# Patient Record
Sex: FEMALE | Race: WHITE | NOT HISPANIC OR LATINO | Employment: FULL TIME | URBAN - METROPOLITAN AREA
[De-identification: names, ages, dates, MRNs, and addresses within clinical notes are randomized per-mention and may not be internally consistent; named-entity substitution may affect disease eponyms.]

---

## 2021-05-26 ENCOUNTER — INITIAL PRENATAL (OUTPATIENT)
Dept: OBSTETRICS AND GYNECOLOGY | Facility: CLINIC | Age: 31
End: 2021-05-26

## 2021-05-26 VITALS — WEIGHT: 120.8 LBS | DIASTOLIC BLOOD PRESSURE: 60 MMHG | SYSTOLIC BLOOD PRESSURE: 104 MMHG | BODY MASS INDEX: 22.09 KG/M2

## 2021-05-26 DIAGNOSIS — Z3A.08 8 WEEKS GESTATION OF PREGNANCY: Primary | ICD-10-CM

## 2021-05-26 DIAGNOSIS — R87.619 ABNORMAL CERVICAL PAPANICOLAOU SMEAR, UNSPECIFIED ABNORMAL PAP FINDING: ICD-10-CM

## 2021-05-26 PROBLEM — Z34.90 PREGNANCY: Status: ACTIVE | Noted: 2021-05-26

## 2021-05-26 PROCEDURE — 0501F PRENATAL FLOW SHEET: CPT | Performed by: OBSTETRICS & GYNECOLOGY

## 2021-05-26 RX ORDER — PRENATAL VIT,CAL 76/IRON/FOLIC 29 MG-1 MG
1 TABLET ORAL DAILY
Qty: 30 TABLET | Refills: 11 | Status: SHIPPED | OUTPATIENT
Start: 2021-05-26 | End: 2021-06-25

## 2021-05-26 NOTE — PROGRESS NOTES
Initial ob visit     CC- Here for care of pregnancy        Karuna Mark is a 30 y.o. female, , who presents for her first obstetrical visit.  Her last LMP was Patient's last menstrual period was 2021 (approximate)..    OB History    Para Term  AB Living   2 1 1     1   SAB TAB Ectopic Molar Multiple Live Births             1      # Outcome Date GA Lbr Kaiser/2nd Weight Sex Delivery Anes PTL Lv   2 Current            1 Term         WALE       Initial positive test date : 2021, UPT          Prior obstetric issues, potential pregnancy concerns: None  Family history of genetic issues (includes FOB): None  Prior infections concerning in pregnancy (Rash, fever in last 2 weeks): No   Varicella Hx -No   Prior testing for Cystic Fibrosis Carrier or Sickle Cell Trait- No  Prepregnancy BMI - Body mass index is 22.09 kg/m².  History of STD: yes GC/CHLAMYDIA  Ultrasound Today: Yes.    Additional Pertinent History   Last Pap :   Last Completed Pap Smear     This patient has no relevant Health Maintenance data.        History of abnormal Pap smear: yes -   Family history of uterine, colon, breast, or ovarian cancer: yes - sister  Feelings of Anxiety or Depression: yes - anxiety   Tobacco Usage?: No   Alcohol/Drug Use?: NO  Over the age of 35 at delivery: no  Desires Genetic Screening: Cell Free DNA  Flu Status: Declines    PMH  History reviewed. No pertinent past medical history.    Current Outpatient Medications:   •  prenatal vitamins (PRENATABS RX) 29-1 MG tablet tablet, Take 1 tablet by mouth Daily for 30 days., Disp: 30 tablet, Rfl: 11    The additional following portions of the patient's history were reviewed and updated as appropriate: allergies, current medications, past family history, past medical history, past social history, past surgical history and problem list.    Review of Systems   Review of Systems  Current obstetric complaints : Nausea and cramping   All systems reviewed and  otherwise normal.    I have reviewed and agree with the HPI, ROS, and historical information as entered above. Petar Olvera MD    /60   Wt 54.8 kg (120 lb 12.8 oz)   LMP 03/30/2021 (Approximate)   BMI 22.09 kg/m²     Physical Exam  General:  well developed; well nourished  no acute distress   Chest/Respiratory: No labored breathing, normal respiratory effort, normal appearance, no respiratory noises noted   Heart:  normal rate, regular rhythm,  no murmurs, rubs, or gallops   Thyroid: normal to inspection and palpation   Breasts:  Not performed.   Abdomen: soft, non-tender; no masses  no umbilical or inguinal hernias are present  no hepato-splenomegaly   Pelvis: Not performed.        Assessment and Plan    Problem List Items Addressed This Visit        5/21    Pregnancy - Primary    Overview     Dates by 8 wk u/s.          Relevant Orders    Obstetric Panel    HIV-1 / O / 2 Ag / Antibody 4th Generation    Urine Culture - Urine, Urine, Clean Catch    Chlamydia trachomatis, Neisseria gonorrhoeae, PCR - , Urine, Clean Catch    Urine Drug Screen - Urine, Clean Catch          1. Pregnancy at 8w3d  2. Reviewed routine prenatal care with the office and educational materials given  3. Lab(s) Ordered  4. Discussed options for genetic testing including first trimester nuchal translucency screen, genetic disease carrier testing, quadruple screen, and [place].  5. Patient is on Prenatal vitamins  6. Discussed options of cell free DNA screening.  Patient will let us know at next visit.  Return in about 4 weeks (around 6/23/2021) for Next scheduled follow up.      Petar Olvera MD  05/26/2021

## 2021-05-28 LAB
ABO GROUP BLD: NORMAL
AMPHETAMINES UR QL SCN: NEGATIVE NG/ML
BACTERIA UR CULT: NO GROWTH
BACTERIA UR CULT: NORMAL
BARBITURATES UR QL SCN: NEGATIVE NG/ML
BASOPHILS # BLD AUTO: 0.1 X10E3/UL (ref 0–0.2)
BASOPHILS NFR BLD AUTO: 1 %
BENZODIAZ UR QL SCN: NEGATIVE NG/ML
BLD GP AB SCN SERPL QL: NEGATIVE
BZE UR QL SCN: NEGATIVE NG/ML
C TRACH RRNA SPEC QL NAA+PROBE: NEGATIVE
CANNABINOIDS UR QL SCN: NEGATIVE NG/ML
CREAT UR-MCNC: 43.5 MG/DL (ref 20–300)
EOSINOPHIL # BLD AUTO: 0 X10E3/UL (ref 0–0.4)
EOSINOPHIL NFR BLD AUTO: 0 %
ERYTHROCYTE [DISTWIDTH] IN BLOOD BY AUTOMATED COUNT: 11.9 % (ref 11.7–15.4)
HBV SURFACE AG SERPL QL IA: NEGATIVE
HCT VFR BLD AUTO: 36 % (ref 34–46.6)
HCV AB S/CO SERPL IA: <0.1 S/CO RATIO (ref 0–0.9)
HGB BLD-MCNC: 12.1 G/DL (ref 11.1–15.9)
HIV 1+2 AB+HIV1 P24 AG SERPL QL IA: NON REACTIVE
IMM GRANULOCYTES # BLD AUTO: 0 X10E3/UL (ref 0–0.1)
IMM GRANULOCYTES NFR BLD AUTO: 0 %
LABORATORY COMMENT REPORT: NORMAL
LYMPHOCYTES # BLD AUTO: 1.7 X10E3/UL (ref 0.7–3.1)
LYMPHOCYTES NFR BLD AUTO: 20 %
MCH RBC QN AUTO: 31 PG (ref 26.6–33)
MCHC RBC AUTO-ENTMCNC: 33.6 G/DL (ref 31.5–35.7)
MCV RBC AUTO: 92 FL (ref 79–97)
METHADONE UR QL SCN: NEGATIVE NG/ML
MONOCYTES # BLD AUTO: 0.7 X10E3/UL (ref 0.1–0.9)
MONOCYTES NFR BLD AUTO: 8 %
N GONORRHOEA RRNA SPEC QL NAA+PROBE: NEGATIVE
NEUTROPHILS # BLD AUTO: 5.9 X10E3/UL (ref 1.4–7)
NEUTROPHILS NFR BLD AUTO: 71 %
OPIATES UR QL SCN: NEGATIVE NG/ML
OXYCODONE+OXYMORPHONE UR QL SCN: NEGATIVE NG/ML
PCP UR QL: NEGATIVE NG/ML
PH UR: 7.9 [PH] (ref 4.5–8.9)
PLATELET # BLD AUTO: 263 X10E3/UL (ref 150–450)
PROPOXYPH UR QL SCN: NEGATIVE NG/ML
RBC # BLD AUTO: 3.9 X10E6/UL (ref 3.77–5.28)
RH BLD: POSITIVE
RPR SER QL: NON REACTIVE
RUBV IGG SERPL IA-ACNC: 1.35 INDEX
WBC # BLD AUTO: 8.5 X10E3/UL (ref 3.4–10.8)

## 2021-06-22 ENCOUNTER — ROUTINE PRENATAL (OUTPATIENT)
Dept: OBSTETRICS AND GYNECOLOGY | Facility: CLINIC | Age: 31
End: 2021-06-22

## 2021-06-22 VITALS — BODY MASS INDEX: 23.41 KG/M2 | DIASTOLIC BLOOD PRESSURE: 60 MMHG | WEIGHT: 128 LBS | SYSTOLIC BLOOD PRESSURE: 100 MMHG

## 2021-06-22 DIAGNOSIS — R87.619 ABNORMAL CERVICAL PAPANICOLAOU SMEAR, UNSPECIFIED ABNORMAL PAP FINDING: ICD-10-CM

## 2021-06-22 DIAGNOSIS — Z3A.12 12 WEEKS GESTATION OF PREGNANCY: ICD-10-CM

## 2021-06-22 LAB
GLUCOSE UR STRIP-MCNC: NEGATIVE MG/DL
PROT UR STRIP-MCNC: NEGATIVE MG/DL

## 2021-06-22 PROCEDURE — 0502F SUBSEQUENT PRENATAL CARE: CPT | Performed by: OBSTETRICS & GYNECOLOGY

## 2021-06-22 NOTE — PROGRESS NOTES
OB FOLLOW UP    Karuna Mark is a 30 y.o.  12w2d patient being seen today for her obstetrical follow up visit. Patient reports RL pain. Having trouble starting urine stream, no dysuria.  This has really been only past week or so.    Her prenatal care is complicated by (and status) :    Patient Active Problem List   Diagnosis   • Abnormal Pap smear of cervix   • Pregnancy       ROS -   Patient Reports : RL pain  Patient Denies: Loss of Fluid, Vaginal Spotting, Vision Changes and Headaches  Fetal Movement : absent      /60   Wt 58.1 kg (128 lb)   LMP 2021 (Approximate)   BMI 23.41 kg/m²     Ultrasound Today: no    EXAM:  Vitals: See prenatal flowsheet   Abdomen: See prenatal flowsheet   Urine glucose/protein: See prenatal flowsheet   Pelvic: See prenatal flowsheet     Assessment    Diagnoses and all orders for this visit:    1. Abnormal cervical Papanicolaou smear, unspecified abnormal pap finding    2. 12 weeks gestation of pregnancy  -     POC Urinalysis Dipstick, Automated        1. Pregnancy at 12w2d  2. Fetal status reassuring     Problem List Items Addressed This Visit            Abnormal Pap smear of cervix    Overview     Last pap 2021; abnormal.  2021; colposcopy and biopsy ; told to repeat in one year.   Need records.          Pregnancy    Overview     Dates by 8  3/7 wk u/s.   Unsure cell free DNA.         Relevant Orders    POC Urinalysis Dipstick, Automated (Completed)          Plan    1. Fetal movement/ Labor Precautions  2. Uterine incarceration precautions  3. Declines genetic screening  Follow Up: Return in about 4 weeks (around 2021) for Next scheduled follow up.        Karen Joshi MD  2021

## 2021-07-02 ENCOUNTER — TELEPHONE (OUTPATIENT)
Dept: OBSTETRICS AND GYNECOLOGY | Facility: CLINIC | Age: 31
End: 2021-07-02

## 2021-07-02 NOTE — TELEPHONE ENCOUNTER
Patient is 13w ob went to ER last night for left sided pain. She said they only did labs. She is concerned because some of her labs came back abnormal. She has her results and would like to discuss those with a nurse.

## 2021-07-02 NOTE — TELEPHONE ENCOUNTER
Reviewed patient labs (RBC, H/H) they were low. Recommended Slow Fe for anemia. She reports that she went to [facility]. They did FHT and they were strong. She denied any bleeding, clotting, cramping. They also completed a CCUA and that was negative for a UTI.  Advised her to  increase water intake and to rest. Told to her to call back if anything changed. Patient verbalized understanding.

## 2021-07-20 ENCOUNTER — ROUTINE PRENATAL (OUTPATIENT)
Dept: OBSTETRICS AND GYNECOLOGY | Facility: CLINIC | Age: 31
End: 2021-07-20

## 2021-07-20 VITALS — DIASTOLIC BLOOD PRESSURE: 62 MMHG | SYSTOLIC BLOOD PRESSURE: 100 MMHG | WEIGHT: 135 LBS | BODY MASS INDEX: 24.69 KG/M2

## 2021-07-20 DIAGNOSIS — Z3A.16 16 WEEKS GESTATION OF PREGNANCY: ICD-10-CM

## 2021-07-20 DIAGNOSIS — R87.619 ABNORMAL CERVICAL PAPANICOLAOU SMEAR, UNSPECIFIED ABNORMAL PAP FINDING: ICD-10-CM

## 2021-07-20 LAB
GLUCOSE UR STRIP-MCNC: NEGATIVE MG/DL
PROT UR STRIP-MCNC: NEGATIVE MG/DL

## 2021-07-20 PROCEDURE — 0502F SUBSEQUENT PRENATAL CARE: CPT | Performed by: OBSTETRICS & GYNECOLOGY

## 2021-07-20 NOTE — PROGRESS NOTES
OB FOLLOW UP    Karuna Mark is a 30 y.o.  16w2d patient being seen today for her obstetrical follow up visit. Patient reports no complaints. Feels well, no c/o.    Her prenatal care is complicated by (and status) :    Patient Active Problem List   Diagnosis   • Abnormal Pap smear of cervix   • Pregnancy       ROS -   Patient Reports : No Problems  Patient Denies: Loss of Fluid, Vaginal Spotting, Vision Changes, Headaches and Cramping/Contractions   Fetal Movement : ABSENT      /62   Wt 61.2 kg (135 lb)   LMP 2021 (Approximate)   BMI 24.69 kg/m²     Ultrasound Today: no    EXAM:  Vitals: See prenatal flowsheet   Abdomen: See prenatal flowsheet   Urine glucose/protein: See prenatal flowsheet   Pelvic: See prenatal flowsheet     Assessment    Diagnoses and all orders for this visit:    1. Abnormal cervical Papanicolaou smear, unspecified abnormal pap finding  Assessment & Plan:  Plan pap with HPV at 6wk PP visit.      2. 16 weeks gestation of pregnancy  Assessment & Plan:  Declines genetic screening  Wants IOL 39+wk  [place] Gender!    Orders:  -     POC Urinalysis Dipstick, Automated  -     US Ob 14 + Weeks Single or First Gestation; Future      1. Pregnancy at 16w2d  2. Fetal status reassuring     Problem List Items Addressed This Visit            Abnormal Pap smear of cervix    Overview     Last pap 2021; abnormal.  2021; colposcopy and biopsy ; told to repeat in one year.   Need records.          Current Assessment & Plan     Plan pap with HPV at 6wk PP visit.         Pregnancy    Overview     Dates by 8  3/7 wk u/s.   Unsure cell free DNA.         Current Assessment & Plan     Declines genetic screening  Wants IOL 39+wk  [place] Gender!         Relevant Orders    POC Urinalysis Dipstick, Automated (Completed)    US Ob 14 + Weeks Single or First Gestation          Plan    1. Declines  screening  2. H/o abnl pap - plan pap with HPV at 6wk PP  U/S ordered at follow  up  Diet/exercise precautions  Follow Up: Return in about 4 weeks (around 8/17/2021) for Next scheduled follow up, WITH SONO.        Karen Joshi MD  07/20/2021

## 2021-08-02 ENCOUNTER — TELEPHONE (OUTPATIENT)
Dept: OBSTETRICS AND GYNECOLOGY | Facility: CLINIC | Age: 31
End: 2021-08-02

## 2021-08-02 NOTE — TELEPHONE ENCOUNTER
Patient left a message stating that she is 18 weeks pregnant and has been making sure to drink plenty of water but she is experiencing some symptoms of being really dizzy throughout the day

## 2021-08-02 NOTE — TELEPHONE ENCOUNTER
She is taking PNV with iron and slow fe. She drinks a lot of water, not too much water. This is pretty normal in pregnancy. Rest, stay cool and hydrated. Since she have not cardiac hx just keep regular apt

## 2021-08-17 ENCOUNTER — ROUTINE PRENATAL (OUTPATIENT)
Dept: OBSTETRICS AND GYNECOLOGY | Facility: CLINIC | Age: 31
End: 2021-08-17

## 2021-08-17 VITALS — BODY MASS INDEX: 25.42 KG/M2 | SYSTOLIC BLOOD PRESSURE: 100 MMHG | DIASTOLIC BLOOD PRESSURE: 68 MMHG | WEIGHT: 139 LBS

## 2021-08-17 DIAGNOSIS — Z3A.20 20 WEEKS GESTATION OF PREGNANCY: ICD-10-CM

## 2021-08-17 DIAGNOSIS — R87.619 ABNORMAL CERVICAL PAPANICOLAOU SMEAR, UNSPECIFIED ABNORMAL PAP FINDING: ICD-10-CM

## 2021-08-17 LAB
GLUCOSE UR STRIP-MCNC: NEGATIVE MG/DL
PROT UR STRIP-MCNC: NEGATIVE MG/DL

## 2021-08-17 PROCEDURE — 0502F SUBSEQUENT PRENATAL CARE: CPT | Performed by: OBSTETRICS & GYNECOLOGY

## 2021-08-17 NOTE — PROGRESS NOTES
OB FOLLOW UP    Karuna Mark is a 30 y.o.  20w2d patient being seen today for her obstetrical follow up visit. Patient reports no complaints. Pt feels well, +fm, no c/o.    Her prenatal care is complicated by (and status) :    Patient Active Problem List   Diagnosis   • Abnormal Pap smear of cervix   • Pregnancy       ROS -   Patient Reports : No Problems  Patient Denies: Loss of Fluid, Vaginal Spotting, Vision Changes, Headaches and Cramping/Contractions   Fetal Movement : normal      /68   Wt 63 kg (139 lb)   LMP 2021 (Approximate)   BMI 25.42 kg/m²     Ultrasound Today: yes    EXAM:  Vitals: See prenatal flowsheet   Abdomen: See prenatal flowsheet   Urine glucose/protein: See prenatal flowsheet   Pelvic: See prenatal flowsheet     Assessment    Diagnoses and all orders for this visit:    1. Abnormal cervical Papanicolaou smear, unspecified abnormal pap finding    2. 20 weeks gestation of pregnancy  -     POC Urinalysis Dipstick, Automated        1. Pregnancy at 20w2d  2. Fetal status reassuring     Problem List Items Addressed This Visit            Abnormal Pap smear of cervix    Overview     Last pap 2021; abnormal.  2021; colposcopy and biopsy ; told to repeat in one year.   Need records.          Pregnancy    Overview     Dates by 8  3/7 wk u/s.   Unsure cell free DNA.         Relevant Orders    POC Urinalysis Dipstick, Automated (Completed)          Plan    1. Fetal movement/ Labor Precautions  2. Reviewed normal anatomy scan and limitations thereof.  3. Declines Genetic screening  Follow Up: Return in about 4 weeks (around 2021) for Next scheduled follow up.   Encourage COVID vaccination        Karen Joshi MD  2021

## 2021-08-25 ENCOUNTER — TELEPHONE (OUTPATIENT)
Dept: OBSTETRICS AND GYNECOLOGY | Facility: CLINIC | Age: 31
End: 2021-08-25

## 2021-08-25 ENCOUNTER — ROUTINE PRENATAL (OUTPATIENT)
Dept: OBSTETRICS AND GYNECOLOGY | Facility: CLINIC | Age: 31
End: 2021-08-25

## 2021-08-25 VITALS — DIASTOLIC BLOOD PRESSURE: 60 MMHG | BODY MASS INDEX: 25.24 KG/M2 | SYSTOLIC BLOOD PRESSURE: 100 MMHG | WEIGHT: 138 LBS

## 2021-08-25 DIAGNOSIS — Z3A.21 21 WEEKS GESTATION OF PREGNANCY: ICD-10-CM

## 2021-08-25 DIAGNOSIS — R10.2 PELVIC PRESSURE IN PREGNANCY: Primary | ICD-10-CM

## 2021-08-25 DIAGNOSIS — O26.899 PELVIC PRESSURE IN PREGNANCY: Primary | ICD-10-CM

## 2021-08-25 DIAGNOSIS — O47.9 BRAXTON HICKS CONTRACTIONS: ICD-10-CM

## 2021-08-25 LAB
BILIRUB BLD-MCNC: NEGATIVE MG/DL
CLARITY, POC: CLEAR
COLOR UR: YELLOW
GLUCOSE UR STRIP-MCNC: NEGATIVE MG/DL
KETONES UR QL: NEGATIVE
LEUKOCYTE EST, POC: NEGATIVE
NITRITE UR-MCNC: NEGATIVE MG/ML
PH UR: 7.5 [PH] (ref 5–8)
PROT UR STRIP-MCNC: NEGATIVE MG/DL
RBC # UR STRIP: NEGATIVE /UL
SP GR UR: 1.02 (ref 1–1.03)
UROBILINOGEN UR QL: NORMAL

## 2021-08-25 PROCEDURE — 0502F SUBSEQUENT PRENATAL CARE: CPT | Performed by: NURSE PRACTITIONER

## 2021-08-25 NOTE — TELEPHONE ENCOUNTER
Jere Byers since Friday, intermittent. No more than 5 in an hour.    Vaginal pressure, LLQ pain that shoots down leg.    Denies spotting or dysuria.    Half to full gallon of water daily.    Appointment with JESSI Khan at 1145. Needs CCUA

## 2021-08-25 NOTE — PROGRESS NOTES
OB FOLLOW UP    Karuna Mark is a 30 y.o.  21w3d patient is worked in for  ctx since last Friday, some severe, and vaginal pressure. She also reports LLQ pain, that radiates down her left leg. Denies vb or lof, good fm, no dysuria.    Her prenatal care is complicated by (and status) :    Patient Active Problem List   Diagnosis   • Abnormal Pap smear of cervix   • Pregnancy       ROS -   Patient Reports :  ctx, pressure, LLQ pain  Patient Denies: Loss of Fluid, Vaginal Spotting, Vision Changes and Headaches  Fetal Movement : normal      /60   Wt 62.6 kg (138 lb)   LMP 2021 (Approximate)   BMI 25.24 kg/m²     Ultrasound Today: no    EXAM:  Vitals: See prenatal flowsheet   Abdomen: See prenatal flowsheet   Urine glucose/protein: See prenatal flowsheet   Pelvic: See prenatal flowsheet     Assessment    Diagnoses and all orders for this visit:    1. Pelvic pressure in pregnancy (Primary)  -     US Ob Transvaginal; Future  -     Urine Culture - Urine, Urine, Clean Catch    2. 21 weeks gestation of pregnancy  -     POC Urinalysis Dipstick, Automated  -     Urine Culture - Urine, Urine, Clean Catch    3. [place] contractions        1. Pregnancy at 21w3d  2. Fetal status reassuring     Problem List Items Addressed This Visit        Gravid and     Pregnancy    Overview     Dates by 8  3/7 wk u/s.   Unsure cell free DNA.         Relevant Orders    POC Urinalysis Dipstick, Automated (Completed)    Urine Culture - Urine, Urine, Clean Catch      Other Visit Diagnoses     Pelvic pressure in pregnancy    -  Primary    Relevant Orders    US Ob Transvaginal    Urine Culture - Urine, Urine, Clean Catch    Jere Byers contractions              Plan    1. CCUA trace leuks, sent for culture. U/S today, cervical length 4.6 cm. Cervix closed on exam. Enc adequate hydration, belly band. Precautions reviewed.   2.   Call if symptoms worsen.   3.   Follow up as scheduled, or sooner if needed.          Shonna Day, APRN  08/25/2021

## 2021-08-27 LAB
BACTERIA UR CULT: NORMAL
BACTERIA UR CULT: NORMAL

## 2021-08-31 ENCOUNTER — TELEPHONE (OUTPATIENT)
Dept: OBSTETRICS AND GYNECOLOGY | Facility: CLINIC | Age: 31
End: 2021-08-31

## 2021-08-31 NOTE — TELEPHONE ENCOUNTER
S/w pt and she wanted to know what she can take for allergies during pregnancy. I recommended OTC Medications safe to take during pregnancy from the NOB book. She v/u

## 2021-09-08 ENCOUNTER — TELEPHONE (OUTPATIENT)
Dept: OBSTETRICS AND GYNECOLOGY | Facility: CLINIC | Age: 31
End: 2021-09-08

## 2021-09-14 ENCOUNTER — ROUTINE PRENATAL (OUTPATIENT)
Dept: OBSTETRICS AND GYNECOLOGY | Facility: CLINIC | Age: 31
End: 2021-09-14

## 2021-09-14 VITALS — DIASTOLIC BLOOD PRESSURE: 62 MMHG | BODY MASS INDEX: 26.16 KG/M2 | WEIGHT: 143 LBS | SYSTOLIC BLOOD PRESSURE: 100 MMHG

## 2021-09-14 DIAGNOSIS — Z3A.24 24 WEEKS GESTATION OF PREGNANCY: ICD-10-CM

## 2021-09-14 LAB
GLUCOSE UR STRIP-MCNC: NEGATIVE MG/DL
PROT UR STRIP-MCNC: NEGATIVE MG/DL

## 2021-09-14 PROCEDURE — 0502F SUBSEQUENT PRENATAL CARE: CPT | Performed by: OBSTETRICS & GYNECOLOGY

## 2021-09-14 NOTE — PROGRESS NOTES
OB FOLLOW UP    Karuna Mark is a 30 y.o.  24w2d patient being seen today for her obstetrical follow up visit. Patient reports one episode of dizziness. 28wk instr given/AB+ feels well, good fm.    Her prenatal care is complicated by (and status) :    Patient Active Problem List   Diagnosis   • Abnormal Pap smear of cervix   • Pregnancy       ROS -   Patient Reports : No Problems  Patient Denies: Loss of Fluid, Vaginal Spotting, Vision Changes, Headaches and Cramping/Contractions   Fetal Movement : normal      /62   Wt 64.9 kg (143 lb)   LMP 2021 (Approximate)   BMI 26.16 kg/m²     Ultrasound Today: no    EXAM:  Vitals: See prenatal flowsheet   Abdomen: See prenatal flowsheet   Urine glucose/protein: See prenatal flowsheet   Pelvic: See prenatal flowsheet     Assessment    Diagnoses and all orders for this visit:    1. 24 weeks gestation of pregnancy  -     POC Urinalysis Dipstick, Automated        1. Pregnancy at 24w2d  2. Fetal status reassuring     Problem List Items Addressed This Visit            Pregnancy    Overview     Dates by 8  3/7 wk u/s.   Unsure cell free DNA.         Relevant Orders    POC Urinalysis Dipstick, Automated (Completed)          Plan    1. Fetal movement/ Labor Precautions  2. Encouraged electrolytes, will start taking iron.  Activity recommendation : 150 minutes/week of moderate intensity aerobic activity unless we limit for bleeding, hypertension or other pregnancy complication   Discussed/encouraged social distancing/COVID19 precautions; encouraged vaccination when able  Diet/exercise precautions  Follow Up: Return in about 4 weeks (around 10/12/2021) for Next scheduled follow up.        Karen Joshi MD  2021

## 2021-10-04 ENCOUNTER — TELEPHONE (OUTPATIENT)
Dept: OBSTETRICS AND GYNECOLOGY | Facility: CLINIC | Age: 31
End: 2021-10-04

## 2021-10-04 NOTE — TELEPHONE ENCOUNTER
Patient was wondering about something to take with her sinus congestion. She is 27 weeks pregnant

## 2021-10-04 NOTE — TELEPHONE ENCOUNTER
Pt notified. Her nasal drainage is clear, no fever. Symptoms since 9/2021.Encourage her to go to [facility] and get Covid testing if worsens. She is feeling good fetal movement.

## 2021-10-13 ENCOUNTER — ROUTINE PRENATAL (OUTPATIENT)
Dept: OBSTETRICS AND GYNECOLOGY | Facility: CLINIC | Age: 31
End: 2021-10-13

## 2021-10-13 VITALS — SYSTOLIC BLOOD PRESSURE: 110 MMHG | BODY MASS INDEX: 27.25 KG/M2 | WEIGHT: 149 LBS | DIASTOLIC BLOOD PRESSURE: 70 MMHG

## 2021-10-13 DIAGNOSIS — R87.619 ABNORMAL CERVICAL PAPANICOLAOU SMEAR, UNSPECIFIED ABNORMAL PAP FINDING: ICD-10-CM

## 2021-10-13 DIAGNOSIS — Z3A.28 28 WEEKS GESTATION OF PREGNANCY: Primary | ICD-10-CM

## 2021-10-13 DIAGNOSIS — Z34.82 PRENATAL CARE, SUBSEQUENT PREGNANCY, SECOND TRIMESTER: ICD-10-CM

## 2021-10-13 LAB
EXPIRATION DATE: NORMAL
GLUCOSE UR STRIP-MCNC: NEGATIVE MG/DL
GLUCOSE UR STRIP-MCNC: NEGATIVE MG/DL
Lab: NORMAL
PROT UR STRIP-MCNC: NEGATIVE MG/DL
PROT UR STRIP-MCNC: NEGATIVE MG/DL

## 2021-10-13 PROCEDURE — 0502F SUBSEQUENT PRENATAL CARE: CPT | Performed by: NURSE PRACTITIONER

## 2021-10-13 NOTE — PROGRESS NOTES
OB FOLLOW UP  CC- Here for care of pregnancy        Karuna Mark is a 30 y.o.  28w3d patient being seen today for her obstetrical follow up visit. Patient reports no complaints. 28wk labs/AB+ pt feels well, good fm, several BH ctx throughout day. Has a sit down job, good hydration.    Her prenatal care is complicated by (and status) :    Patient Active Problem List   Diagnosis   • Abnormal Pap smear of cervix   • Pregnancy       Flu Status: Declines  Ultrasound Today: No.    ROS -   Patient Reports : No Problems  Patient Denies: Loss of Fluid, Vaginal Spotting, Vision Changes, Headaches, Nausea  and Vomiting   Fetal Movement : normal  All other systems reviewed and are negative.       The additional following portions of the patient's history were reviewed and updated as appropriate: allergies, current medications, past family history, past medical history, past social history, past surgical history and problem list.    I have reviewed and agree with the HPI, ROS, and historical information as entered above. Shonna Day, APRN    /70   Wt 67.6 kg (149 lb)   LMP 2021 (Approximate)   BMI 27.25 kg/m²       EXAM:     FHT: 152 BPM   Uterine Size: 28 cm  Pelvic Exam: No    Urine glucose/protein: See prenatal flowsheet       Assessment and Plan    Problem List Items Addressed This Visit        Genitourinary and Reproductive     Abnormal Pap smear of cervix    Overview     Last pap 2021; abnormal.  2021; colposcopy and biopsy ; told to repeat in one year.   Need records.             Gravid and     Pregnancy - Primary    Overview     Dates by 8  3/7 wk u/s.   Unsure cell free DNA.         Relevant Orders    POC Urinalysis Dipstick (Completed)    POC Urinalysis Dipstick, Automated      Other Visit Diagnoses     Prenatal care, subsequent pregnancy, second trimester        Relevant Orders    Gestational Screen 1 Hr ([facility])    Antibody Screen    CBC (No Diff)          1. Pregnancy at  28w3d  2. Fetal status reassuring.   3. Kick counts and 28 week teaching reviewed. Offered Tdap, will think about it. Declines flu vaccine.   Return in about 4 weeks (around 11/10/2021) for Dr Joshi.    Shonna Day, APRN  10/13/2021

## 2021-10-14 LAB
BLD GP AB SCN SERPL QL: NEGATIVE
ERYTHROCYTE [DISTWIDTH] IN BLOOD BY AUTOMATED COUNT: 12.4 % (ref 12.3–15.4)
GLUCOSE 1H P 50 G GLC PO SERPL-MCNC: 80 MG/DL (ref 65–139)
HCT VFR BLD AUTO: 35 % (ref 34–46.6)
HGB BLD-MCNC: 11.8 G/DL (ref 12–15.9)
MCH RBC QN AUTO: 32.1 PG (ref 26.6–33)
MCHC RBC AUTO-ENTMCNC: 33.7 G/DL (ref 31.5–35.7)
MCV RBC AUTO: 95.1 FL (ref 79–97)
PLATELET # BLD AUTO: 203 10*3/MM3 (ref 140–450)
RBC # BLD AUTO: 3.68 10*6/MM3 (ref 3.77–5.28)
WBC # BLD AUTO: 10.21 10*3/MM3 (ref 3.4–10.8)

## 2021-10-21 ENCOUNTER — TELEPHONE (OUTPATIENT)
Dept: OBSTETRICS AND GYNECOLOGY | Facility: CLINIC | Age: 31
End: 2021-10-21

## 2021-10-21 NOTE — TELEPHONE ENCOUNTER
Pt stated that she's been having rick callahan since 20 weeks but they have worsen here lately. She stated they are now painful but not excruciating pain, but she feels pressure. She also stated that she's been having a sharp pain under her belly button that shots down along with back pain.

## 2021-10-21 NOTE — TELEPHONE ENCOUNTER
Spoke to the patient. Offered appt but she said that she is busy with work. She said that she will monitor this. She says that she has been going non stop the last few days/weeks and has probably not gotten enough rest.     She denies any leaking, bleeding, urinary symptoms. She is going to call back tomorrow if she is not feeling any better.

## 2021-11-09 ENCOUNTER — ROUTINE PRENATAL (OUTPATIENT)
Dept: OBSTETRICS AND GYNECOLOGY | Facility: CLINIC | Age: 31
End: 2021-11-09

## 2021-11-09 VITALS — DIASTOLIC BLOOD PRESSURE: 72 MMHG | BODY MASS INDEX: 28.35 KG/M2 | WEIGHT: 155 LBS | SYSTOLIC BLOOD PRESSURE: 110 MMHG

## 2021-11-09 DIAGNOSIS — R87.619 ABNORMAL CERVICAL PAPANICOLAOU SMEAR, UNSPECIFIED ABNORMAL PAP FINDING: ICD-10-CM

## 2021-11-09 DIAGNOSIS — Z3A.32 32 WEEKS GESTATION OF PREGNANCY: ICD-10-CM

## 2021-11-09 LAB
GLUCOSE UR STRIP-MCNC: NEGATIVE MG/DL
PROT UR STRIP-MCNC: NEGATIVE MG/DL

## 2021-11-09 PROCEDURE — 0502F SUBSEQUENT PRENATAL CARE: CPT | Performed by: OBSTETRICS & GYNECOLOGY

## 2021-11-09 NOTE — PROGRESS NOTES
OB FOLLOW UP    Karuna Mark is a 30 y.o.  32w2d patient being seen today for her obstetrical follow up visit. Patient reports no complaints. Pt feels well, good fm has had contractions, sometimes regular.  States she never had [place]ers with her first pregnancy and delivery at 40 weeks.    Her prenatal care is complicated by (and status) :    Patient Active Problem List   Diagnosis   • Abnormal Pap smear of cervix   • Pregnancy       ROS -   Patient Reports : Cramping/Contractions   Patient Denies: Loss of Fluid, Vaginal Spotting, Vision Changes and Headaches  Fetal Movement : normal      /72   Wt 70.3 kg (155 lb)   LMP 2021 (Approximate)   BMI 28.35 kg/m²     Ultrasound Today: no    EXAM:  Vitals: See prenatal flowsheet   Abdomen: See prenatal flowsheet   Urine glucose/protein: See prenatal flowsheet   Pelvic: See prenatal flowsheet     Assessment    Diagnoses and all orders for this visit:    1. Abnormal cervical Papanicolaou smear, unspecified abnormal pap finding    2. 32 weeks gestation of pregnancy  -     POC Urinalysis Dipstick        1. Pregnancy at 32w2d  2. Fetal status reassuring     Problem List Items Addressed This Visit            Abnormal Pap smear of cervix    Overview     Last pap 2021; abnormal.  2021; colposcopy and biopsy ; told to repeat in one year.   Need records.          Pregnancy    Overview     Dates by 8  3/7 wk u/s.   Declines genetic testing  Induction of labor scheduled for  at 5 AM         Relevant Orders    POC Urinalysis Dipstick (Completed)          Plan    1. Fetal movement/ Labor Precautions  2. Encouraged flu vaccine  Follow Up: Return in about 2 weeks (around 2021) for Next scheduled follow up.        Karen Joshi MD  2021

## 2021-11-15 ENCOUNTER — TELEPHONE (OUTPATIENT)
Dept: OBSTETRICS AND GYNECOLOGY | Facility: CLINIC | Age: 31
End: 2021-11-15

## 2021-11-15 ENCOUNTER — ROUTINE PRENATAL (OUTPATIENT)
Dept: OBSTETRICS AND GYNECOLOGY | Facility: CLINIC | Age: 31
End: 2021-11-15

## 2021-11-15 VITALS — SYSTOLIC BLOOD PRESSURE: 102 MMHG | WEIGHT: 157.6 LBS | BODY MASS INDEX: 28.83 KG/M2 | DIASTOLIC BLOOD PRESSURE: 60 MMHG

## 2021-11-15 DIAGNOSIS — O47.00 PREMATURE UTERINE CONTRACTIONS: Primary | ICD-10-CM

## 2021-11-15 DIAGNOSIS — Z3A.33 33 WEEKS GESTATION OF PREGNANCY: ICD-10-CM

## 2021-11-15 DIAGNOSIS — R87.619 ABNORMAL CERVICAL PAPANICOLAOU SMEAR, UNSPECIFIED ABNORMAL PAP FINDING: ICD-10-CM

## 2021-11-15 LAB
BILIRUB BLD-MCNC: NEGATIVE MG/DL
CLARITY, POC: CLEAR
COLOR UR: YELLOW
GLUCOSE UR STRIP-MCNC: NEGATIVE MG/DL
KETONES UR QL: NEGATIVE
LEUKOCYTE EST, POC: NEGATIVE
NITRITE UR-MCNC: NEGATIVE MG/ML
PH UR: 7 [PH] (ref 5–8)
PROT UR STRIP-MCNC: NEGATIVE MG/DL
RBC # UR STRIP: NEGATIVE /UL
SP GR UR: 1.02 (ref 1–1.03)
UROBILINOGEN UR QL: NORMAL

## 2021-11-15 PROCEDURE — 0502F SUBSEQUENT PRENATAL CARE: CPT | Performed by: NURSE PRACTITIONER

## 2021-11-15 PROCEDURE — 59025 FETAL NON-STRESS TEST: CPT | Performed by: NURSE PRACTITIONER

## 2021-11-15 RX ORDER — NIFEDIPINE 10 MG/1
10 CAPSULE ORAL EVERY 4 HOURS PRN
Qty: 30 CAPSULE | Refills: 1 | Status: SHIPPED | OUTPATIENT
Start: 2021-11-15 | End: 2021-12-29 | Stop reason: HOSPADM

## 2021-11-15 RX ORDER — PRENATAL VIT NO.126/IRON/FOLIC 28MG-0.8MG
TABLET ORAL DAILY
COMMUNITY

## 2021-11-15 NOTE — TELEPHONE ENCOUNTER
She called back. She is concerned about the stress on the baby because she did not do this with her first pregnancy. She is having more than 10 CTX's in one hour. She has no one to watch her 6 year old daughter and I confirmed with our  she can bring that child.

## 2021-11-15 NOTE — TELEPHONE ENCOUNTER
33 weeks, pt was triaged at a hosp in [facility] and one in [place]. Her urine was negative. The first place said she was bill and dilated and the second one said she was not. 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 I said come 3:50 and she has to get her kids off the bus. She will call back. She can be seen by our NP's at a time that works for her.

## 2021-11-15 NOTE — PROGRESS NOTES
OB FOLLOW UP  CC- Here for care of pregnancy        Karuna Mark is a 30 y.o.  33w1d patient being seen today for her obstetrical follow up visit. Patient reports regular contraction beginning on Friday. She reports that they were occurring every 3-5 mins, lasting for 30-40 seconds.  She was seen in L&D in [facility].  She says that they told her she was dilated to a 1, and she was given an injection to slow contractions and was discharged.  She says that it helped for two hours and then they picked back up.  They remained regular, every 3-5 mins on both Saturday and .  Yesterday she was seen at [facility] L&D in [place], where she says that's she was not dilated. Today, they have continued to remain every 3-5 minutes.  She says that when she began timing them after she got off work, she had 12 within an hour.      She describes it as discomfort in her low abdomen and back and tightening in her upper abdomen.  Fetal movement has remained adequate throughout the weekend.      She denies LOF, vaginal spotting, headaches or vision changes. Admits to mild swelling in her legs.     Her prenatal care is complicated by (and status) :    Patient Active Problem List   Diagnosis   • Abnormal Pap smear of cervix   • Pregnancy     Ultrasound Today: No.  Non Stress Test: Yes minutes 25  non-stress test: NST: Reactive, irreg ctx   indication: R/O NASH    ROS -   Patient Reports : Contractions  Patient Denies: Loss of Fluid and Vaginal Spotting  Fetal Movement : adequate  All other systems reviewed and are negative.       The additional following portions of the patient's history were reviewed and updated as appropriate: allergies, current medications, past family history, past medical history, past social history, past surgical history and problem list.    I have reviewed and agree with the HPI, ROS, and historical information as entered above. Shonna Day, APRN    /60   Wt 71.5 kg (157 lb 9.6 oz)    LMP 2021 (Approximate)   BMI 28.83 kg/m²       EXAM:     FHT: positive BPM   Pelvic Exam: cervix closed (examined by Madelyn)    Urine glucose/protein: See prenatal flowsheet       Assessment and Plan    Problem List Items Addressed This Visit        Genitourinary and Reproductive     Abnormal Pap smear of cervix    Overview     Last pap 2021; abnormal.  2021; colposcopy and biopsy ; told to repeat in one year.   Need records.             Gravid and     Pregnancy    Overview     Dates by 8  3/7 wk u/s.   Declines genetic testing  Induction of labor scheduled for  at 5 AM         Relevant Orders    POC Urinalysis Dipstick (Completed)    Fetal Nonstress Test      Other Visit Diagnoses     Premature uterine contractions    -  Primary    Relevant Orders    POC Urinalysis Dipstick (Completed)    Fetal Nonstress Test          1. Pregnancy at 33w1d  2. Fetal status reassuring. CCUA negative. NST reactive, +irreg ctx. Cervix closed on exam. Per Madelyn, Rx Procardia 10 mg every 4-8 hrs prn. Call if contractions worsen.   3. Follow up as scheduled next week.       Shonna Day, APRN  11/15/2021

## 2021-11-15 NOTE — TELEPHONE ENCOUNTER
PT SAYS BEEN TO TWO DIFFERENT HOSPITALS ONE SAID SHE'S PEG, ONE SAID SHES NOT, SHES JUST CONCERNED AS WHAT TO DO..

## 2021-11-17 ENCOUNTER — TELEPHONE (OUTPATIENT)
Dept: OBSTETRICS AND GYNECOLOGY | Facility: CLINIC | Age: 31
End: 2021-11-17

## 2021-11-17 NOTE — TELEPHONE ENCOUNTER
Pharmacy called and needs quantity clarification on recent medication. Was called in to [facility] by us but transferred to [facility] due to lack of stock at [facility].

## 2021-11-24 ENCOUNTER — ROUTINE PRENATAL (OUTPATIENT)
Dept: OBSTETRICS AND GYNECOLOGY | Facility: CLINIC | Age: 31
End: 2021-11-24

## 2021-11-24 VITALS — BODY MASS INDEX: 28.5 KG/M2 | DIASTOLIC BLOOD PRESSURE: 70 MMHG | SYSTOLIC BLOOD PRESSURE: 110 MMHG | WEIGHT: 155.8 LBS

## 2021-11-24 DIAGNOSIS — R87.619 ABNORMAL CERVICAL PAPANICOLAOU SMEAR, UNSPECIFIED ABNORMAL PAP FINDING: ICD-10-CM

## 2021-11-24 DIAGNOSIS — Z3A.34 34 WEEKS GESTATION OF PREGNANCY: ICD-10-CM

## 2021-11-24 LAB
GLUCOSE UR STRIP-MCNC: NEGATIVE MG/DL
PROT UR STRIP-MCNC: NEGATIVE MG/DL

## 2021-11-24 PROCEDURE — 0502F SUBSEQUENT PRENATAL CARE: CPT | Performed by: OBSTETRICS & GYNECOLOGY

## 2021-11-30 ENCOUNTER — ROUTINE PRENATAL (OUTPATIENT)
Dept: OBSTETRICS AND GYNECOLOGY | Facility: CLINIC | Age: 31
End: 2021-11-30

## 2021-11-30 VITALS — BODY MASS INDEX: 28.53 KG/M2 | SYSTOLIC BLOOD PRESSURE: 100 MMHG | DIASTOLIC BLOOD PRESSURE: 70 MMHG | WEIGHT: 156 LBS

## 2021-11-30 DIAGNOSIS — R87.619 ABNORMAL CERVICAL PAPANICOLAOU SMEAR, UNSPECIFIED ABNORMAL PAP FINDING: ICD-10-CM

## 2021-11-30 DIAGNOSIS — O47.00 PRETERM UTERINE CONTRACTIONS, ANTEPARTUM: Primary | ICD-10-CM

## 2021-11-30 DIAGNOSIS — Z3A.35 35 WEEKS GESTATION OF PREGNANCY: ICD-10-CM

## 2021-11-30 LAB
GLUCOSE UR STRIP-MCNC: NEGATIVE MG/DL
PROT UR STRIP-MCNC: NEGATIVE MG/DL

## 2021-11-30 PROCEDURE — 87081 CULTURE SCREEN ONLY: CPT | Performed by: OBSTETRICS & GYNECOLOGY

## 2021-11-30 PROCEDURE — 0502F SUBSEQUENT PRENATAL CARE: CPT | Performed by: OBSTETRICS & GYNECOLOGY

## 2021-11-30 NOTE — PROGRESS NOTES
OB FOLLOW UP    Karuna Mark is a 30 y.o.  35w2d patient being seen today for her obstetrical follow up visit. Patient reports no complaints. Pt feels well, good fm, +ctx. Having dizzy spells, discussed diet and eating more sugar/carbs. Talked about BS spiking, eat more protein    Patient stopped procardia when she had dizzy spell on Saturday.    Her prenatal care is complicated by (and status) :    Patient Active Problem List   Diagnosis   • Abnormal Pap smear of cervix   • Pregnancy   •  uterine contractions, antepartum       ROS -   Patient Reports : No Problems  Patient Denies: Loss of Fluid, Vaginal Spotting, Vision Changes, Headaches and Cramping/Contractions   Fetal Movement : normal      /70   Wt 70.8 kg (156 lb)   LMP 2021 (Approximate)   BMI 28.53 kg/m²     Ultrasound Today: no    EXAM:  Vitals: See prenatal flowsheet   Abdomen: See prenatal flowsheet   Urine glucose/protein: See prenatal flowsheet   Pelvic: See prenatal flowsheet     Assessment    Diagnoses and all orders for this visit:    1.  uterine contractions, antepartum (Primary)  -     Cancel: Group B Streptococcus Culture - , Vagina  -     Cancel: Group B Streptococcus Culture - , Vaginal/Rectum  -     Group B Streptococcus Culture - Swab, Vaginal/Rectum    2. Abnormal cervical Papanicolaou smear, unspecified abnormal pap finding    3. 35 weeks gestation of pregnancy  -     POC Urinalysis Dipstick  -     Cancel: Group B Streptococcus Culture - , Vagina  -     Cancel: Group B Streptococcus Culture - , Vaginal/Rectum  -     Group B Streptococcus Culture - Swab, Vaginal/Rectum        1. Pregnancy at 35w2d  2. Fetal status reassuring     Problem List Items Addressed This Visit            Abnormal Pap smear of cervix    Overview     Last pap 2021; abnormal.  2021; colposcopy and biopsy ; told to repeat in one year.   Need records.          Pregnancy    Overview     Dates by 8  3/7 wk u/s.   Declines  genetic testing  Induction of labor scheduled for  at 5 AM         Relevant Orders    POC Urinalysis Dipstick (Completed)    Group B Streptococcus Culture - Swab, Vaginal/Rectum       Other     uterine contractions, antepartum - Primary    Relevant Orders    Group B Streptococcus Culture - Swab, Vaginal/Rectum          Plan    1. Fetal movement/ Labor Precautions  2. GBS done today  Follow Up: Return in about 2 weeks (around 2021) for Next scheduled follow up.        Karen Joshi MD  2021

## 2021-12-03 LAB — BACTERIA SPEC AEROBE CULT: NORMAL

## 2021-12-13 ENCOUNTER — ROUTINE PRENATAL (OUTPATIENT)
Dept: OBSTETRICS AND GYNECOLOGY | Facility: CLINIC | Age: 31
End: 2021-12-13

## 2021-12-13 VITALS — WEIGHT: 158.6 LBS | SYSTOLIC BLOOD PRESSURE: 102 MMHG | DIASTOLIC BLOOD PRESSURE: 70 MMHG | BODY MASS INDEX: 29.01 KG/M2

## 2021-12-13 DIAGNOSIS — Z3A.37 37 WEEKS GESTATION OF PREGNANCY: Primary | ICD-10-CM

## 2021-12-13 LAB
CLARITY, POC: CLEAR
COLOR UR: YELLOW
GLUCOSE UR STRIP-MCNC: NEGATIVE MG/DL
PROT UR STRIP-MCNC: NEGATIVE MG/DL

## 2021-12-13 PROCEDURE — 0502F SUBSEQUENT PRENATAL CARE: CPT | Performed by: NURSE PRACTITIONER

## 2021-12-13 NOTE — PROGRESS NOTES
OB FOLLOW UP  CC- Here for care of pregnancy        Karuna Mark is a 30 y.o.  37w1d patient being seen today for her obstetrical follow up visit. Patient reports occasional contractions, cramping and tightness in belly. Patient stated that sharp pain that shoots through her left leg. Patient stated that the pain is intense to where she has to stop walking or she will fall. Patient stated that she also has a sharp pain that shoots through pelvic area. Patient stated that the pain comes randomly.     Her prenatal care is complicated by (and status) :    Patient Active Problem List   Diagnosis   • Abnormal Pap smear of cervix   • Pregnancy   •  uterine contractions, antepartum         Flu Status: Declines  GBS Status: Was already done and it was negative.   Her Delivery Plan is: [facility] IOL at 39wks. Scheduled  Ultrasound Today: No.    ROS -   Patient Reports : Cramping, Contractions, and Tightness in belly  Patient Denies: Loss of Fluid, Vaginal Spotting, Vision Changes and Headaches  Fetal Movement : normal  All other systems reviewed and are negative.       The additional following portions of the patient's history were reviewed and updated as appropriate: allergies and current medications.    I have reviewed and agree with the HPI, ROS, and historical information as entered above. Belem Hendricks, APRN        /70   Wt 71.9 kg (158 lb 9.6 oz)   LMP 2021 (Approximate)   BMI 29.01 kg/m²     EXAM:     FHT: pos BPM   Uterine Size: size equals dates  Pelvic Exam: Yes Dilation: FT  Station: -1    Urine glucose/protein: See prenatal flowsheet       Assessment and Plan    Problem List Items Addressed This Visit        Gravid and     Pregnancy - Primary    Overview     Dates by 8  3/7 wk u/s.   Declines genetic testing  Induction of labor scheduled for  at 5 AM         Relevant Orders    POC Urinalysis Dipstick (Completed)          1. Pregnancy at 37w1d  2. Fetal status  reassuring.   3. Activity and Exercise discussed.  Return in about 1 week (around 12/20/2021) for NERI YUMIKO.   Labor precautions and kick counts reviewed    Belem Hendricks, APRN  12/13/2021

## 2021-12-17 ENCOUNTER — TELEPHONE (OUTPATIENT)
Dept: OBSTETRICS AND GYNECOLOGY | Facility: CLINIC | Age: 31
End: 2021-12-17

## 2021-12-17 NOTE — TELEPHONE ENCOUNTER
PT IS CALLING WITH C/O BLURRED VISION, NO H/A OR SWELLING, JUST NOTICED IT'S BEEN BUGGING HER TODAY FOR THE FIRST TIME, HASN'T ATE TOO MUCH TODAY, ADVISED TO EAT SMALL SNACKS AND MEALS OFTEN

## 2021-12-17 NOTE — TELEPHONE ENCOUNTER
37 wks and noticed some mild intermittent blurred vision today. Denies H/A, epigastric pain. States B/P has been normal and good FM.  Reviewed S&S of preeclampsia and pt will rest, force fluids and go to  with any worsening symptoms or elevated B/P

## 2021-12-18 ENCOUNTER — PREP FOR SURGERY (OUTPATIENT)
Dept: OTHER | Facility: HOSPITAL | Age: 31
End: 2021-12-18

## 2021-12-18 DIAGNOSIS — Z3A.39 39 WEEKS GESTATION OF PREGNANCY: Primary | ICD-10-CM

## 2021-12-18 RX ORDER — METHYLERGONOVINE MALEATE 0.2 MG/ML
200 INJECTION INTRAVENOUS ONCE AS NEEDED
Status: CANCELLED | OUTPATIENT
Start: 2021-12-18

## 2021-12-18 RX ORDER — OXYTOCIN/0.9 % SODIUM CHLORIDE 30/500 ML
85 PLASTIC BAG, INJECTION (ML) INTRAVENOUS ONCE
Status: CANCELLED | OUTPATIENT
Start: 2021-12-18 | End: 2021-12-18

## 2021-12-18 RX ORDER — OXYTOCIN/0.9 % SODIUM CHLORIDE 30/500 ML
650 PLASTIC BAG, INJECTION (ML) INTRAVENOUS ONCE
Status: CANCELLED | OUTPATIENT
Start: 2021-12-18 | End: 2021-12-18

## 2021-12-18 RX ORDER — ONDANSETRON 4 MG/1
4 TABLET, FILM COATED ORAL EVERY 6 HOURS PRN
Status: CANCELLED | OUTPATIENT
Start: 2021-12-18

## 2021-12-18 RX ORDER — ONDANSETRON 2 MG/ML
4 INJECTION INTRAMUSCULAR; INTRAVENOUS EVERY 6 HOURS PRN
Status: CANCELLED | OUTPATIENT
Start: 2021-12-18

## 2021-12-18 RX ORDER — MISOPROSTOL 200 UG/1
800 TABLET ORAL AS NEEDED
Status: CANCELLED | OUTPATIENT
Start: 2021-12-18

## 2021-12-18 RX ORDER — CARBOPROST TROMETHAMINE 250 UG/ML
250 INJECTION, SOLUTION INTRAMUSCULAR AS NEEDED
Status: CANCELLED | OUTPATIENT
Start: 2021-12-18

## 2021-12-18 RX ORDER — SODIUM CHLORIDE, SODIUM LACTATE, POTASSIUM CHLORIDE, CALCIUM CHLORIDE 600; 310; 30; 20 MG/100ML; MG/100ML; MG/100ML; MG/100ML
125 INJECTION, SOLUTION INTRAVENOUS CONTINUOUS
Status: CANCELLED | OUTPATIENT
Start: 2021-12-18

## 2021-12-18 RX ORDER — SODIUM CHLORIDE 0.9 % (FLUSH) 0.9 %
3-10 SYRINGE (ML) INJECTION AS NEEDED
Status: CANCELLED | OUTPATIENT
Start: 2021-12-18

## 2021-12-18 RX ORDER — LIDOCAINE HYDROCHLORIDE 10 MG/ML
5 INJECTION, SOLUTION EPIDURAL; INFILTRATION; INTRACAUDAL; PERINEURAL AS NEEDED
Status: CANCELLED | OUTPATIENT
Start: 2021-12-18

## 2021-12-18 RX ORDER — ACETAMINOPHEN 325 MG/1
650 TABLET ORAL EVERY 4 HOURS PRN
Status: CANCELLED | OUTPATIENT
Start: 2021-12-18

## 2021-12-18 RX ORDER — SODIUM CHLORIDE 0.9 % (FLUSH) 0.9 %
3 SYRINGE (ML) INJECTION EVERY 12 HOURS SCHEDULED
Status: CANCELLED | OUTPATIENT
Start: 2021-12-18

## 2021-12-18 RX ORDER — IBUPROFEN 600 MG/1
600 TABLET ORAL EVERY 6 HOURS PRN
Status: CANCELLED | OUTPATIENT
Start: 2021-12-18

## 2021-12-18 RX ORDER — MAGNESIUM CARB/ALUMINUM HYDROX 105-160MG
30 TABLET,CHEWABLE ORAL ONCE
Status: CANCELLED | OUTPATIENT
Start: 2021-12-18 | End: 2021-12-18

## 2021-12-18 RX ORDER — OXYTOCIN/0.9 % SODIUM CHLORIDE 30/500 ML
2-30 PLASTIC BAG, INJECTION (ML) INTRAVENOUS
Status: CANCELLED | OUTPATIENT
Start: 2021-12-18

## 2021-12-18 RX ORDER — BUTORPHANOL TARTRATE 1 MG/ML
1 INJECTION, SOLUTION INTRAMUSCULAR; INTRAVENOUS
Status: CANCELLED | OUTPATIENT
Start: 2021-12-18

## 2021-12-20 ENCOUNTER — ROUTINE PRENATAL (OUTPATIENT)
Dept: OBSTETRICS AND GYNECOLOGY | Facility: CLINIC | Age: 31
End: 2021-12-20

## 2021-12-20 ENCOUNTER — TELEPHONE (OUTPATIENT)
Dept: OBSTETRICS AND GYNECOLOGY | Facility: CLINIC | Age: 31
End: 2021-12-20

## 2021-12-20 VITALS — BODY MASS INDEX: 29.48 KG/M2 | DIASTOLIC BLOOD PRESSURE: 70 MMHG | SYSTOLIC BLOOD PRESSURE: 110 MMHG | WEIGHT: 161.2 LBS

## 2021-12-20 DIAGNOSIS — Z3A.38 38 WEEKS GESTATION OF PREGNANCY: Primary | ICD-10-CM

## 2021-12-20 LAB
GLUCOSE UR STRIP-MCNC: NEGATIVE MG/DL
PROT UR STRIP-MCNC: NEGATIVE MG/DL

## 2021-12-20 PROCEDURE — 0502F SUBSEQUENT PRENATAL CARE: CPT | Performed by: OBSTETRICS & GYNECOLOGY

## 2021-12-20 PROCEDURE — 59025 FETAL NON-STRESS TEST: CPT | Performed by: OBSTETRICS & GYNECOLOGY

## 2021-12-20 NOTE — TELEPHONE ENCOUNTER
Hx Precip delivery. She is having having increased pressure and pain and discomfort in her rectal area. She is feeling like possible labor. She denies bloody show or LOF. She states she has been bill since 34 weeks and was dilated 1 1/2 CM's. She is at [facility] with her son and can barely walk or stand up. Come back for cervix check.

## 2021-12-20 NOTE — PROGRESS NOTES
OB FOLLOW UP    Karuna Mark is a 31 y.o.  38w1d patient being seen today for her obstetrical follow up visit. Patient reports has returned to the office after being seen this AM for pelvic/rectal pressure and contractions.  NST-reactive  Her prenatal care is complicated by (and status) :    Patient Active Problem List   Diagnosis   • Abnormal Pap smear of cervix   • Pregnancy   •  uterine contractions, antepartum   • Primary inadequate contractions       ROS -   All other systems reviewed and neg    /70   Wt 73.1 kg (161 lb 3.2 oz)   LMP 2021 (Approximate)   BMI 29.48 kg/m²     Ultrasound Today: no    EXAM:  Vitals: See prenatal flowsheet   Abdomen: See prenatal flowsheet   Urine glucose/protein: See prenatal flowsheet   Pelvic: See prenatal flowsheet     Assessment    Diagnoses and all orders for this visit:    1. 38 weeks gestation of pregnancy (Primary)  -     POC Urinalysis Dipstick  -     Fetal Nonstress Test        1. Pregnancy at 38w1d  2. Fetal status reassuring     Problem List Items Addressed This Visit            Pregnancy - Primary    Overview     Dates by 8  3/7 wk u/s.   Declines genetic testing  Induction of labor scheduled for  at 5 AM         Relevant Orders    POC Urinalysis Dipstick (Completed)    Fetal Nonstress Test          Plan    1. Fetal movement/ Labor Precautions  2. Has IOL scheduled next week if no labor by then.  Diet/exercise precautions  Follow Up: Return in about 1 week (around 2021).        Karen Joshi MD  2021

## 2021-12-20 NOTE — PROGRESS NOTES
Non Stress Test: minutes >20  non-stress test: NST: Reactive  indication: Questionable Labor  category: Category I   Contractions ~ q5-6min      Patient rechecked.  CE unchanged.    Labor precautions.    Karen Joshi MD  12/20/21  17:21 EST

## 2021-12-20 NOTE — PROGRESS NOTES
OB FOLLOW UP  CC- Here for care of pregnancy     Karuna Mark is a 31 y.o.  38w1d patient being seen today for her obstetrical follow up visit. Patient reports that she is having extreme bilateral leg pain with numbess.  Patient states the left is worse and has extreme weakness.  Patient has passed 5 kidney stones in the last 3 days.  Patient stated she has taken herself off from work in last week.  Patient states she having BH and a lot pelvic pressure in the last week.          Her prenatal care is complicated by (and status) :    Patient Active Problem List   Diagnosis   • Abnormal Pap smear of cervix   • Pregnancy   •  uterine contractions, antepartum   • Primary inadequate contractions       The additional following portions of the patient's history were reviewed and updated as appropriate: allergies, current medications, past family history, past medical history, past social history and past surgical history.    ROS -   Patient Reports : Bilateral leg pain  Patient Denies: Loss of Fluid, Vaginal Spotting, Vision Changes and Headaches  Fetal Movement : normal    I have reviewed and agree with the HPI, ROS, and historical information as entered above. Karen Joshi MD    /70   Wt 73.1 kg (161 lb 3.2 oz)   LMP 2021 (Approximate)   BMI 29.48 kg/m²     Ultrasound Today: no    EXAM:  Vitals: See prenatal flowsheet   Abdomen: See prenatal flowsheet   Urine glucose/protein: See prenatal flowsheet   HRT: See prenatal flowsheet   Presentation: See prenatal flowsheet   Pelvic: See flowsheet     GBS Status: Was done on 2021.  The results are negative.   Her Delivery Plan is: [facility] IOL at 39wks. Scheduled for 2021 at 0500 am.  Would like to discuss if she can get her Covid testing closer to home.  Patient lives in [place], [place].    Patient Active Problem List   Diagnosis   • Abnormal Pap smear of cervix   • Pregnancy   •  uterine contractions, antepartum   • Primary  inadequate contractions       Assessment and Plan    Problem List Items Addressed This Visit        5/21    Pregnancy - Primary    Overview     Dates by 8  3/7 wk u/s.   Declines genetic testing  Induction of labor scheduled for 12/27 at 5 AM         Relevant Orders    POC Urinalysis Dipstick (Completed)    Fetal Nonstress Test          1. Pregnancy at 38w1d  2. Fetal status reassuring.     Plan:    1. Fetal movement/Labor Precautions  Wants to move up IOL.  Will have to call L&D  Follow Up: Return in about 1 week (around 12/27/2021).      Karen Joshi MD  12/20/2021

## 2021-12-21 ENCOUNTER — TELEPHONE (OUTPATIENT)
Dept: OBSTETRICS AND GYNECOLOGY | Facility: CLINIC | Age: 31
End: 2021-12-21

## 2021-12-21 DIAGNOSIS — Z01.818 PRE-OP TESTING: Primary | ICD-10-CM

## 2021-12-21 NOTE — TELEPHONE ENCOUNTER
Najma asked me about it already, so I think she called her back.  She was coming in for recheck of cervix and it looked like she left a urine and then was waiting somewhere.  I was just trying to find her to go ahead and put her in a room, but I guess she decided to just wait and come back next week.

## 2021-12-23 ENCOUNTER — TELEPHONE (OUTPATIENT)
Dept: OBSTETRICS AND GYNECOLOGY | Facility: CLINIC | Age: 31
End: 2021-12-23

## 2021-12-23 NOTE — TELEPHONE ENCOUNTER
Patient called and reported that she has been experiencing constipation and the urge to urinate but is not able to urinate when she feels like she needs to. Would like to speak with nurse for further advisement.

## 2021-12-23 NOTE — TELEPHONE ENCOUNTER
Pt lvm stating she is now having trouble urinating and had to get on all fours in the shower in order to urinate

## 2021-12-23 NOTE — TELEPHONE ENCOUNTER
She got in the shower on all fours and was able to urinate. She is already taking Stool softeners BID. Add miralax or Milk of mag. She is feeling good fetal movement and denies severe pain of LOF or bloody show. Her IOL is 12/27/21

## 2021-12-24 ENCOUNTER — APPOINTMENT (OUTPATIENT)
Dept: PREADMISSION TESTING | Facility: HOSPITAL | Age: 31
End: 2021-12-24

## 2021-12-24 DIAGNOSIS — Z01.818 PRE-OP TESTING: Primary | ICD-10-CM

## 2021-12-27 ENCOUNTER — HOSPITAL ENCOUNTER (INPATIENT)
Facility: HOSPITAL | Age: 31
LOS: 2 days | Discharge: HOME | End: 2021-12-29
Attending: OBSTETRICS & GYNECOLOGY | Admitting: OBSTETRICS & GYNECOLOGY

## 2021-12-27 ENCOUNTER — ANESTHESIA (OUTPATIENT)
Dept: LABOR AND DELIVERY | Facility: HOSPITAL | Age: 31
End: 2021-12-27

## 2021-12-27 ENCOUNTER — HOSPITAL ENCOUNTER (OUTPATIENT)
Dept: LABOR AND DELIVERY | Facility: HOSPITAL | Age: 31
Discharge: HOME | End: 2021-12-27

## 2021-12-27 ENCOUNTER — ANESTHESIA EVENT (OUTPATIENT)
Dept: LABOR AND DELIVERY | Facility: HOSPITAL | Age: 31
End: 2021-12-27

## 2021-12-27 DIAGNOSIS — Z3A.39 39 WEEKS GESTATION OF PREGNANCY: ICD-10-CM

## 2021-12-27 PROBLEM — Z34.90 PREGNANT: Status: ACTIVE | Noted: 2021-12-27

## 2021-12-27 PROBLEM — Z34.90 NORMAL PREGNANCY: Status: ACTIVE | Noted: 2021-12-27

## 2021-12-27 LAB
ABO GROUP BLD: NORMAL
ABO GROUP BLD: NORMAL
ALP SERPL-CCNC: 107 U/L (ref 39–117)
ALT SERPL W P-5'-P-CCNC: 14 U/L (ref 1–33)
AST SERPL-CCNC: 14 U/L (ref 1–32)
BILIRUB SERPL-MCNC: 0.3 MG/DL (ref 0–1.2)
BLD GP AB SCN SERPL QL: NEGATIVE
CREAT SERPL-MCNC: 0.53 MG/DL (ref 0.57–1)
DEPRECATED RDW RBC AUTO: 40.6 FL (ref 37–54)
ERYTHROCYTE [DISTWIDTH] IN BLOOD BY AUTOMATED COUNT: 12.3 % (ref 12.3–15.4)
HCT VFR BLD AUTO: 35.2 % (ref 34–46.6)
HGB BLD-MCNC: 12.4 G/DL (ref 12–15.9)
LDH SERPL-CCNC: 160 U/L (ref 135–214)
MCH RBC QN AUTO: 32.3 PG (ref 26.6–33)
MCHC RBC AUTO-ENTMCNC: 35.2 G/DL (ref 31.5–35.7)
MCV RBC AUTO: 91.7 FL (ref 79–97)
PLATELET # BLD AUTO: 178 10*3/MM3 (ref 140–450)
PMV BLD AUTO: 11.8 FL (ref 6–12)
RBC # BLD AUTO: 3.84 10*6/MM3 (ref 3.77–5.28)
RH BLD: POSITIVE
RH BLD: POSITIVE
T&S EXPIRATION DATE: NORMAL
URATE SERPL-MCNC: 3.7 MG/DL (ref 2.4–5.7)
WBC NRBC COR # BLD: 10.09 10*3/MM3 (ref 3.4–10.8)

## 2021-12-27 PROCEDURE — C1755 CATHETER, INTRASPINAL: HCPCS | Performed by: ANESTHESIOLOGY

## 2021-12-27 PROCEDURE — 86900 BLOOD TYPING SEROLOGIC ABO: CPT

## 2021-12-27 PROCEDURE — 25010000002 ROPIVACAINE PER 1 MG: Performed by: NURSE ANESTHETIST, CERTIFIED REGISTERED

## 2021-12-27 PROCEDURE — 86850 RBC ANTIBODY SCREEN: CPT | Performed by: OBSTETRICS & GYNECOLOGY

## 2021-12-27 PROCEDURE — 84460 ALANINE AMINO (ALT) (SGPT): CPT | Performed by: OBSTETRICS & GYNECOLOGY

## 2021-12-27 PROCEDURE — 83615 LACTATE (LD) (LDH) ENZYME: CPT | Performed by: OBSTETRICS & GYNECOLOGY

## 2021-12-27 PROCEDURE — 84075 ASSAY ALKALINE PHOSPHATASE: CPT | Performed by: OBSTETRICS & GYNECOLOGY

## 2021-12-27 PROCEDURE — 25010000002 ONDANSETRON PER 1 MG: Performed by: NURSE ANESTHETIST, CERTIFIED REGISTERED

## 2021-12-27 PROCEDURE — 82247 BILIRUBIN TOTAL: CPT | Performed by: OBSTETRICS & GYNECOLOGY

## 2021-12-27 PROCEDURE — 86900 BLOOD TYPING SEROLOGIC ABO: CPT | Performed by: OBSTETRICS & GYNECOLOGY

## 2021-12-27 PROCEDURE — 59400 OBSTETRICAL CARE: CPT | Performed by: OBSTETRICS & GYNECOLOGY

## 2021-12-27 PROCEDURE — S0260 H&P FOR SURGERY: HCPCS | Performed by: OBSTETRICS & GYNECOLOGY

## 2021-12-27 PROCEDURE — 84450 TRANSFERASE (AST) (SGOT): CPT | Performed by: OBSTETRICS & GYNECOLOGY

## 2021-12-27 PROCEDURE — 25010000002 FENTANYL CITRATE (PF) 50 MCG/ML SOLUTION: Performed by: NURSE ANESTHETIST, CERTIFIED REGISTERED

## 2021-12-27 PROCEDURE — 86901 BLOOD TYPING SEROLOGIC RH(D): CPT

## 2021-12-27 PROCEDURE — 82565 ASSAY OF CREATININE: CPT | Performed by: OBSTETRICS & GYNECOLOGY

## 2021-12-27 PROCEDURE — 51702 INSERT TEMP BLADDER CATH: CPT

## 2021-12-27 PROCEDURE — 3E033VJ INTRODUCTION OF OTHER HORMONE INTO PERIPHERAL VEIN, PERCUTANEOUS APPROACH: ICD-10-PCS | Performed by: OBSTETRICS & GYNECOLOGY

## 2021-12-27 PROCEDURE — 85027 COMPLETE CBC AUTOMATED: CPT | Performed by: OBSTETRICS & GYNECOLOGY

## 2021-12-27 PROCEDURE — 36415 COLL VENOUS BLD VENIPUNCTURE: CPT | Performed by: OBSTETRICS & GYNECOLOGY

## 2021-12-27 PROCEDURE — C1755 CATHETER, INTRASPINAL: HCPCS

## 2021-12-27 PROCEDURE — 86901 BLOOD TYPING SEROLOGIC RH(D): CPT | Performed by: OBSTETRICS & GYNECOLOGY

## 2021-12-27 PROCEDURE — 59025 FETAL NON-STRESS TEST: CPT

## 2021-12-27 PROCEDURE — 84550 ASSAY OF BLOOD/URIC ACID: CPT | Performed by: OBSTETRICS & GYNECOLOGY

## 2021-12-27 PROCEDURE — 0KQM0ZZ REPAIR PERINEUM MUSCLE, OPEN APPROACH: ICD-10-PCS | Performed by: OBSTETRICS & GYNECOLOGY

## 2021-12-27 RX ORDER — ACETAMINOPHEN 325 MG/1
650 TABLET ORAL EVERY 4 HOURS PRN
Status: DISCONTINUED | OUTPATIENT
Start: 2021-12-27 | End: 2021-12-27 | Stop reason: HOSPADM

## 2021-12-27 RX ORDER — ERYTHROMYCIN 5 MG/G
OINTMENT OPHTHALMIC
Status: DISCONTINUED
Start: 2021-12-27 | End: 2021-12-29 | Stop reason: HOSPADM

## 2021-12-27 RX ORDER — CARBOPROST TROMETHAMINE 250 UG/ML
250 INJECTION, SOLUTION INTRAMUSCULAR AS NEEDED
Status: DISCONTINUED | OUTPATIENT
Start: 2021-12-27 | End: 2021-12-27 | Stop reason: HOSPADM

## 2021-12-27 RX ORDER — IBUPROFEN 600 MG/1
600 TABLET ORAL EVERY 6 HOURS PRN
Status: DISCONTINUED | OUTPATIENT
Start: 2021-12-27 | End: 2021-12-27 | Stop reason: HOSPADM

## 2021-12-27 RX ORDER — OXYTOCIN/0.9 % SODIUM CHLORIDE 30/500 ML
85 PLASTIC BAG, INJECTION (ML) INTRAVENOUS ONCE
Status: COMPLETED | OUTPATIENT
Start: 2021-12-27 | End: 2021-12-27

## 2021-12-27 RX ORDER — OXYTOCIN/0.9 % SODIUM CHLORIDE 30/500 ML
2-30 PLASTIC BAG, INJECTION (ML) INTRAVENOUS
Status: DISCONTINUED | OUTPATIENT
Start: 2021-12-27 | End: 2021-12-27 | Stop reason: HOSPADM

## 2021-12-27 RX ORDER — LIDOCAINE HYDROCHLORIDE AND EPINEPHRINE 15; 5 MG/ML; UG/ML
INJECTION, SOLUTION EPIDURAL AS NEEDED
Status: DISCONTINUED | OUTPATIENT
Start: 2021-12-27 | End: 2021-12-27 | Stop reason: SURG

## 2021-12-27 RX ORDER — ONDANSETRON 2 MG/ML
4 INJECTION INTRAMUSCULAR; INTRAVENOUS ONCE AS NEEDED
Status: COMPLETED | OUTPATIENT
Start: 2021-12-27 | End: 2021-12-27

## 2021-12-27 RX ORDER — SODIUM CHLORIDE 0.9 % (FLUSH) 0.9 %
3-10 SYRINGE (ML) INJECTION AS NEEDED
Status: DISCONTINUED | OUTPATIENT
Start: 2021-12-27 | End: 2021-12-27 | Stop reason: HOSPADM

## 2021-12-27 RX ORDER — FENTANYL CITRATE 50 UG/ML
INJECTION, SOLUTION INTRAMUSCULAR; INTRAVENOUS AS NEEDED
Status: DISCONTINUED | OUTPATIENT
Start: 2021-12-27 | End: 2021-12-27 | Stop reason: SURG

## 2021-12-27 RX ORDER — HYDROCORTISONE 25 MG/G
1 CREAM TOPICAL AS NEEDED
Status: DISCONTINUED | OUTPATIENT
Start: 2021-12-27 | End: 2021-12-29 | Stop reason: HOSPADM

## 2021-12-27 RX ORDER — DIPHENHYDRAMINE HYDROCHLORIDE 50 MG/ML
12.5 INJECTION INTRAMUSCULAR; INTRAVENOUS EVERY 8 HOURS PRN
Status: DISCONTINUED | OUTPATIENT
Start: 2021-12-27 | End: 2021-12-27 | Stop reason: HOSPADM

## 2021-12-27 RX ORDER — TRISODIUM CITRATE DIHYDRATE AND CITRIC ACID MONOHYDRATE 500; 334 MG/5ML; MG/5ML
30 SOLUTION ORAL ONCE
Status: DISCONTINUED | OUTPATIENT
Start: 2021-12-27 | End: 2021-12-27 | Stop reason: HOSPADM

## 2021-12-27 RX ORDER — IBUPROFEN 600 MG/1
600 TABLET ORAL EVERY 6 HOURS SCHEDULED
Status: DISCONTINUED | OUTPATIENT
Start: 2021-12-27 | End: 2021-12-29 | Stop reason: HOSPADM

## 2021-12-27 RX ORDER — ONDANSETRON 4 MG/1
4 TABLET, FILM COATED ORAL EVERY 6 HOURS PRN
Status: DISCONTINUED | OUTPATIENT
Start: 2021-12-27 | End: 2021-12-27 | Stop reason: HOSPADM

## 2021-12-27 RX ORDER — EPHEDRINE SULFATE 5 MG/ML
INJECTION INTRAVENOUS
Status: DISCONTINUED
Start: 2021-12-27 | End: 2021-12-29 | Stop reason: HOSPADM

## 2021-12-27 RX ORDER — SODIUM CHLORIDE 0.9 % (FLUSH) 0.9 %
3 SYRINGE (ML) INJECTION EVERY 12 HOURS SCHEDULED
Status: DISCONTINUED | OUTPATIENT
Start: 2021-12-27 | End: 2021-12-27 | Stop reason: HOSPADM

## 2021-12-27 RX ORDER — METOCLOPRAMIDE HYDROCHLORIDE 5 MG/ML
10 INJECTION INTRAMUSCULAR; INTRAVENOUS ONCE AS NEEDED
Status: DISCONTINUED | OUTPATIENT
Start: 2021-12-27 | End: 2021-12-27 | Stop reason: HOSPADM

## 2021-12-27 RX ORDER — SODIUM CHLORIDE 0.9 % (FLUSH) 0.9 %
1-10 SYRINGE (ML) INJECTION AS NEEDED
Status: DISCONTINUED | OUTPATIENT
Start: 2021-12-27 | End: 2021-12-29 | Stop reason: HOSPADM

## 2021-12-27 RX ORDER — SODIUM CHLORIDE, SODIUM LACTATE, POTASSIUM CHLORIDE, CALCIUM CHLORIDE 600; 310; 30; 20 MG/100ML; MG/100ML; MG/100ML; MG/100ML
125 INJECTION, SOLUTION INTRAVENOUS CONTINUOUS
Status: DISCONTINUED | OUTPATIENT
Start: 2021-12-27 | End: 2021-12-29 | Stop reason: HOSPADM

## 2021-12-27 RX ORDER — METHYLERGONOVINE MALEATE 0.2 MG/ML
200 INJECTION INTRAVENOUS ONCE AS NEEDED
Status: DISCONTINUED | OUTPATIENT
Start: 2021-12-27 | End: 2021-12-27 | Stop reason: HOSPADM

## 2021-12-27 RX ORDER — BUTORPHANOL TARTRATE 1 MG/ML
1 INJECTION, SOLUTION INTRAMUSCULAR; INTRAVENOUS
Status: DISCONTINUED | OUTPATIENT
Start: 2021-12-27 | End: 2021-12-27 | Stop reason: HOSPADM

## 2021-12-27 RX ORDER — MISOPROSTOL 200 UG/1
800 TABLET ORAL AS NEEDED
Status: DISCONTINUED | OUTPATIENT
Start: 2021-12-27 | End: 2021-12-27 | Stop reason: HOSPADM

## 2021-12-27 RX ORDER — MISOPROSTOL 200 UG/1
600 TABLET ORAL AS NEEDED
Status: DISCONTINUED | OUTPATIENT
Start: 2021-12-27 | End: 2021-12-29 | Stop reason: HOSPADM

## 2021-12-27 RX ORDER — FAMOTIDINE 10 MG/ML
20 INJECTION, SOLUTION INTRAVENOUS ONCE AS NEEDED
Status: DISCONTINUED | OUTPATIENT
Start: 2021-12-27 | End: 2021-12-27 | Stop reason: HOSPADM

## 2021-12-27 RX ORDER — LIDOCAINE HYDROCHLORIDE 10 MG/ML
5 INJECTION, SOLUTION EPIDURAL; INFILTRATION; INTRACAUDAL; PERINEURAL AS NEEDED
Status: DISCONTINUED | OUTPATIENT
Start: 2021-12-27 | End: 2021-12-27 | Stop reason: HOSPADM

## 2021-12-27 RX ORDER — BISACODYL 10 MG
10 SUPPOSITORY, RECTAL RECTAL DAILY PRN
Status: DISCONTINUED | OUTPATIENT
Start: 2021-12-28 | End: 2021-12-29 | Stop reason: HOSPADM

## 2021-12-27 RX ORDER — DOCUSATE SODIUM 100 MG/1
100 CAPSULE, LIQUID FILLED ORAL 2 TIMES DAILY
Status: DISCONTINUED | OUTPATIENT
Start: 2021-12-27 | End: 2021-12-29 | Stop reason: HOSPADM

## 2021-12-27 RX ORDER — ROPIVACAINE HYDROCHLORIDE 2 MG/ML
15 INJECTION, SOLUTION EPIDURAL; INFILTRATION; PERINEURAL CONTINUOUS
Status: DISCONTINUED | OUTPATIENT
Start: 2021-12-27 | End: 2021-12-29 | Stop reason: HOSPADM

## 2021-12-27 RX ORDER — OXYTOCIN/0.9 % SODIUM CHLORIDE 30/500 ML
650 PLASTIC BAG, INJECTION (ML) INTRAVENOUS ONCE
Status: DISCONTINUED | OUTPATIENT
Start: 2021-12-27 | End: 2021-12-27 | Stop reason: HOSPADM

## 2021-12-27 RX ORDER — EPHEDRINE SULFATE 5 MG/ML
10 INJECTION INTRAVENOUS
Status: DISCONTINUED | OUTPATIENT
Start: 2021-12-27 | End: 2021-12-27 | Stop reason: HOSPADM

## 2021-12-27 RX ORDER — ONDANSETRON 2 MG/ML
4 INJECTION INTRAMUSCULAR; INTRAVENOUS EVERY 6 HOURS PRN
Status: DISCONTINUED | OUTPATIENT
Start: 2021-12-27 | End: 2021-12-27 | Stop reason: HOSPADM

## 2021-12-27 RX ORDER — MAGNESIUM CARB/ALUMINUM HYDROX 105-160MG
30 TABLET,CHEWABLE ORAL ONCE
Status: DISCONTINUED | OUTPATIENT
Start: 2021-12-27 | End: 2021-12-27 | Stop reason: HOSPADM

## 2021-12-27 RX ADMIN — EPHEDRINE SULFATE 10 MG: 5 INJECTION INTRAVENOUS at 11:30

## 2021-12-27 RX ADMIN — ONDANSETRON 4 MG: 2 INJECTION INTRAMUSCULAR; INTRAVENOUS at 11:01

## 2021-12-27 RX ADMIN — EPHEDRINE SULFATE 10 MG: 5 INJECTION INTRAVENOUS at 11:01

## 2021-12-27 RX ADMIN — SODIUM CHLORIDE, POTASSIUM CHLORIDE, SODIUM LACTATE AND CALCIUM CHLORIDE 125 ML/HR: 600; 310; 30; 20 INJECTION, SOLUTION INTRAVENOUS at 10:36

## 2021-12-27 RX ADMIN — HYDROCORTISONE 1 APPLICATION: 25 CREAM TOPICAL at 19:00

## 2021-12-27 RX ADMIN — WITCH HAZEL 1 PAD: 500 SOLUTION RECTAL; TOPICAL at 19:00

## 2021-12-27 RX ADMIN — SODIUM CHLORIDE, POTASSIUM CHLORIDE, SODIUM LACTATE AND CALCIUM CHLORIDE 125 ML/HR: 600; 310; 30; 20 INJECTION, SOLUTION INTRAVENOUS at 05:50

## 2021-12-27 RX ADMIN — DOCUSATE SODIUM 100 MG: 100 CAPSULE, LIQUID FILLED ORAL at 20:23

## 2021-12-27 RX ADMIN — Medication 2 MILLI-UNITS/MIN: at 08:03

## 2021-12-27 RX ADMIN — Medication 85 ML/HR: at 16:04

## 2021-12-27 RX ADMIN — IBUPROFEN 600 MG: 600 TABLET ORAL at 19:53

## 2021-12-27 RX ADMIN — LIDOCAINE HYDROCHLORIDE AND EPINEPHRINE 3 ML: 15; 5 INJECTION, SOLUTION EPIDURAL at 10:05

## 2021-12-27 RX ADMIN — ROPIVACAINE HYDROCHLORIDE 10 ML: 5 INJECTION, SOLUTION EPIDURAL; INFILTRATION; PERINEURAL at 10:08

## 2021-12-27 RX ADMIN — FENTANYL CITRATE 100 MCG: 50 INJECTION, SOLUTION INTRAMUSCULAR; INTRAVENOUS at 10:08

## 2021-12-27 RX ADMIN — SODIUM CHLORIDE, POTASSIUM CHLORIDE, SODIUM LACTATE AND CALCIUM CHLORIDE 1000 ML: 600; 310; 30; 20 INJECTION, SOLUTION INTRAVENOUS at 09:45

## 2021-12-27 RX ADMIN — ROPIVACAINE HYDROCHLORIDE 15 ML/HR: 2 INJECTION, SOLUTION EPIDURAL; INFILTRATION at 10:10

## 2021-12-27 RX ADMIN — BENZOCAINE AND LEVOMENTHOL: 200; 5 SPRAY TOPICAL at 19:00

## 2021-12-27 RX ADMIN — EPHEDRINE SULFATE 10 MG: 5 INJECTION INTRAVENOUS at 10:19

## 2021-12-27 NOTE — ANESTHESIA PROCEDURE NOTES
Labor Epidural      Patient reassessed immediately prior to procedure    Patient location during procedure: floor  Performed By  CRNA: Alejandra Genao CRNA  Preanesthetic Checklist  Completed: patient identified, IV checked, risks and benefits discussed, surgical consent, monitors and equipment checked, pre-op evaluation and timeout performed  Prep:  Pt Position:sitting  Sterile Tech:cap, gloves, mask and sterile barrier  Prep:DuraPrep  Monitoring:blood pressure monitoring  Epidural Block Procedure:  Approach:midline  Guidance:palpation technique  Location:L3-L4  Needle Type:Tuohy  Needle Gauge:17 G  Loss of Resistance Medium: air  Loss of Resistance: 3cm  Cath Depth at skin:9 cm  Paresthesia: none  Aspiration:negative  Test Dose:negative  Number of Attempts: 1  Post Assessment:  Dressing:occlusive dressing applied and secured with tape  Pt Tolerance:patient tolerated the procedure well with no apparent complications  Complications:no

## 2021-12-28 LAB
BASOPHILS # BLD AUTO: 0.07 10*3/MM3 (ref 0–0.2)
BASOPHILS NFR BLD AUTO: 0.5 % (ref 0–1.5)
DEPRECATED RDW RBC AUTO: 42.4 FL (ref 37–54)
EOSINOPHIL # BLD AUTO: 0.05 10*3/MM3 (ref 0–0.4)
EOSINOPHIL NFR BLD AUTO: 0.4 % (ref 0.3–6.2)
ERYTHROCYTE [DISTWIDTH] IN BLOOD BY AUTOMATED COUNT: 12.3 % (ref 12.3–15.4)
HCT VFR BLD AUTO: 36.1 % (ref 34–46.6)
HGB BLD-MCNC: 12.1 G/DL (ref 12–15.9)
IMM GRANULOCYTES # BLD AUTO: 0.09 10*3/MM3 (ref 0–0.05)
IMM GRANULOCYTES NFR BLD AUTO: 0.6 % (ref 0–0.5)
LYMPHOCYTES # BLD AUTO: 1.81 10*3/MM3 (ref 0.7–3.1)
LYMPHOCYTES NFR BLD AUTO: 12.8 % (ref 19.6–45.3)
MCH RBC QN AUTO: 32.1 PG (ref 26.6–33)
MCHC RBC AUTO-ENTMCNC: 33.5 G/DL (ref 31.5–35.7)
MCV RBC AUTO: 95.8 FL (ref 79–97)
MONOCYTES # BLD AUTO: 0.61 10*3/MM3 (ref 0.1–0.9)
MONOCYTES NFR BLD AUTO: 4.3 % (ref 5–12)
NEUTROPHILS NFR BLD AUTO: 11.53 10*3/MM3 (ref 1.7–7)
NEUTROPHILS NFR BLD AUTO: 81.4 % (ref 42.7–76)
NRBC BLD AUTO-RTO: 0 /100 WBC (ref 0–0.2)
PLATELET # BLD AUTO: 157 10*3/MM3 (ref 140–450)
PMV BLD AUTO: 11.7 FL (ref 6–12)
RBC # BLD AUTO: 3.77 10*6/MM3 (ref 3.77–5.28)
WBC NRBC COR # BLD: 14.16 10*3/MM3 (ref 3.4–10.8)

## 2021-12-28 PROCEDURE — 85025 COMPLETE CBC W/AUTO DIFF WBC: CPT | Performed by: OBSTETRICS & GYNECOLOGY

## 2021-12-28 PROCEDURE — 0503F POSTPARTUM CARE VISIT: CPT | Performed by: NURSE PRACTITIONER

## 2021-12-28 RX ADMIN — IBUPROFEN 600 MG: 600 TABLET ORAL at 20:41

## 2021-12-28 RX ADMIN — IBUPROFEN 600 MG: 600 TABLET ORAL at 02:20

## 2021-12-28 NOTE — ANESTHESIA POSTPROCEDURE EVALUATION
Patient: Karuna Mark    Procedure Summary     Date: 12/27/21 Room / Location:     Anesthesia Start: 0957 Anesthesia Stop: 1438    Procedure: LABOR ANALGESIA Diagnosis:     Scheduled Providers:  Provider: Ileana Lerma DO    Anesthesia Type: epidural ASA Status: 2          Anesthesia Type: epidural    Vitals  Vitals Value Taken Time   /63 12/28/21 0800   Temp 98.6 °F (37 °C) 12/28/21 0800   Pulse 75 12/28/21 0800   Resp 16 12/28/21 0800   SpO2             Post Anesthesia Care and Evaluation    Patient location during evaluation: bedside  Patient participation: complete - patient participated  Level of consciousness: awake and alert  Pain management: adequate  Airway patency: patent  Anesthetic complications: No anesthetic complications    Cardiovascular status: acceptable  Respiratory status: acceptable  Hydration status: acceptable  Post Neuraxial Block status: Motor and sensory function returned to baseline and No signs or symptoms of PDPH

## 2021-12-29 VITALS
RESPIRATION RATE: 16 BRPM | SYSTOLIC BLOOD PRESSURE: 104 MMHG | HEIGHT: 62 IN | HEART RATE: 60 BPM | BODY MASS INDEX: 29.63 KG/M2 | DIASTOLIC BLOOD PRESSURE: 58 MMHG | TEMPERATURE: 97.8 F | WEIGHT: 161 LBS

## 2021-12-29 PROCEDURE — 0503F POSTPARTUM CARE VISIT: CPT | Performed by: NURSE PRACTITIONER

## 2022-01-13 ENCOUNTER — TELEPHONE (OUTPATIENT)
Dept: OBSTETRICS AND GYNECOLOGY | Facility: CLINIC | Age: 32
End: 2022-01-13

## 2022-01-13 NOTE — TELEPHONE ENCOUNTER
Pt. States she was walking up her stairs yesterday when she passed a golf ball sized clot and started bleeding heavier than she had been. She reports being active most of the day yesterday cleaning, she had sat for about 6 hours the day before that. Her bleeding today is less than yesterday but she reports some cramping. When she went to the ER in [place] yesterday evening, they did a pelvic exam but no US and told her it was likely that she had stopped breast feeding on Friday. Denies fevers. Will discuss with NP and call back. Likely ok to monitor.

## 2022-01-13 NOTE — TELEPHONE ENCOUNTER
Discussed with  KN: Plan to continue to monitor, anymore large clots, heavy bleeding, significant pain, or fevers then call back. She VU Also reports speaking with JTA on call last night and was told to do fundal massages.

## 2022-01-13 NOTE — TELEPHONE ENCOUNTER
Patient called and stated she delivered a few weeks ago, she said she passed a blood clot and she went to the ER and she said that they told her that it is from were she stopped breast feeding, she wants to know what she needs to do her number is [phone]

## 2022-01-25 ENCOUNTER — TELEPHONE (OUTPATIENT)
Dept: OBSTETRICS AND GYNECOLOGY | Facility: CLINIC | Age: 32
End: 2022-01-25

## 2022-01-25 NOTE — TELEPHONE ENCOUNTER
She needs a mon or thur apts. Also she needed to move her 6 week PP visit to a morning apt with Dr. Joshi. She passed a large clot and then had blood running down her leg so she went to ER for eval. Her bleeding has stopped and she is taking PNV and slow fe. She denies chest pain or SOB. Rest, hydration. Since her bleeding has stopped she can monitor for severe dizziness or bleeding and come in for sooner apt for labs. She is no longer breast feeding.

## 2022-01-25 NOTE — TELEPHONE ENCOUNTER
Patient left a message stating that she is 4 weeks postpartum and she has felt out of it and has been experiencing some dizziness the past 5 days.

## 2022-02-07 ENCOUNTER — POSTPARTUM VISIT (OUTPATIENT)
Dept: OBSTETRICS AND GYNECOLOGY | Facility: CLINIC | Age: 32
End: 2022-02-07

## 2022-02-07 VITALS
BODY MASS INDEX: 25.4 KG/M2 | SYSTOLIC BLOOD PRESSURE: 110 MMHG | HEIGHT: 62 IN | WEIGHT: 138 LBS | DIASTOLIC BLOOD PRESSURE: 72 MMHG

## 2022-02-07 PROCEDURE — 0503F POSTPARTUM CARE VISIT: CPT | Performed by: OBSTETRICS & GYNECOLOGY

## 2022-02-07 RX ORDER — CITALOPRAM 20 MG/1
20 TABLET ORAL DAILY
Qty: 30 TABLET | Refills: 2 | Status: SHIPPED | OUTPATIENT
Start: 2022-02-07 | End: 2023-02-07

## 2022-02-07 RX ORDER — NORETHINDRONE ACETATE AND ETHINYL ESTRADIOL AND FERROUS FUMARATE 1MG-20(24)
1 KIT ORAL DAILY
Qty: 28 TABLET | Refills: 12 | Status: SHIPPED | OUTPATIENT
Start: 2022-02-07 | End: 2023-02-07

## 2022-02-07 NOTE — PROGRESS NOTES
"6 week postpartum visit      Karuna Mark is a 31 y.o.  s/p Vaginal delivery at 39.1 weeks on 21, who presents today for a 6 week postpartum check.      Patient reports postpartum depression.  Patient describes bleeding as absent.  Patient is bottle feeding.  Desires contraceptive methods: OCP (estrogen/progesterone) for contraception.  Denies bowel or bladder issues.    Date of Last Pap:2021 in [place] abnormal colpo +HPV    Last Completed Pap Smear          PAP SMEAR (Every 3 Years) Next due on 2022  SCANNED - PAP SMEAR                 H/x of abnormal: yes    PHYSICAL EXAM:    /72   Ht 157.5 cm (62\")   Wt 62.6 kg (138 lb)   LMP 2021 (Approximate)   Breastfeeding No   BMI 25.24 kg/m²   Abdomen: +BS, benign, no masses, soft, non-tender.  Incision: no  Bimanual exam: External genitalia appear normal.  Vagina pink, moist, rugated, laceration healed.  Uterus non-tender.  No palpable masses in adnexa.   Extremities: No deep calf tenderness.  Postpartum Depression Screening Questionnaire: 8, patient wants to discuss treatment.  Baby Name: aSm    IMPRESSION/PLAN:  31 y.o.  s/p Vaginal delivery, 6 weeks postpartum.  Doing well  - Recovered nicely from her delivery  - Celexa, f/u prn.   - Contraception: contraceptive methods: OCP (estrogen/progesterone)  - Resume annual gynecological examinations    Karen Joshi MD  2022  "